# Patient Record
Sex: FEMALE | Race: WHITE | NOT HISPANIC OR LATINO | Employment: UNEMPLOYED | URBAN - NONMETROPOLITAN AREA
[De-identification: names, ages, dates, MRNs, and addresses within clinical notes are randomized per-mention and may not be internally consistent; named-entity substitution may affect disease eponyms.]

---

## 2021-07-30 ENCOUNTER — HOSPITAL ENCOUNTER (EMERGENCY)
Facility: HOSPITAL | Age: 51
Discharge: HOME | End: 2021-07-30
Attending: EMERGENCY MEDICINE | Admitting: EMERGENCY MEDICINE
Payer: COMMERCIAL

## 2021-07-30 VITALS
HEART RATE: 84 BPM | SYSTOLIC BLOOD PRESSURE: 176 MMHG | BODY MASS INDEX: 28.17 KG/M2 | TEMPERATURE: 97.6 F | RESPIRATION RATE: 18 BRPM | HEIGHT: 64 IN | OXYGEN SATURATION: 98 % | DIASTOLIC BLOOD PRESSURE: 87 MMHG | WEIGHT: 165 LBS

## 2021-07-30 DIAGNOSIS — H60.92 LEFT OTITIS EXTERNA: Primary | ICD-10-CM

## 2021-07-30 PROCEDURE — 99282 EMERGENCY DEPT VISIT SF MDM: CPT

## 2021-07-30 PROCEDURE — 99284 EMERGENCY DEPT VISIT MOD MDM: CPT | Performed by: PHYSICIAN ASSISTANT

## 2021-07-30 PROCEDURE — 96372 THER/PROPH/DIAG INJ SC/IM: CPT

## 2021-07-30 RX ORDER — MULTIVITAMIN WITH IRON
1 TABLET ORAL DAILY
COMMUNITY

## 2021-07-30 RX ORDER — CIPROFLOXACIN 500 MG/1
500 TABLET, FILM COATED ORAL ONCE
Status: COMPLETED | OUTPATIENT
Start: 2021-07-30 | End: 2021-07-30

## 2021-07-30 RX ORDER — IBUPROFEN 600 MG/1
600 TABLET ORAL EVERY 6 HOURS PRN
Qty: 30 TABLET | Refills: 0 | Status: SHIPPED | OUTPATIENT
Start: 2021-07-30

## 2021-07-30 RX ORDER — CIPROFLOXACIN 500 MG/1
500 TABLET, FILM COATED ORAL EVERY 12 HOURS
Qty: 14 TABLET | Refills: 0 | Status: SHIPPED | OUTPATIENT
Start: 2021-07-30 | End: 2021-08-06

## 2021-07-30 RX ORDER — OMEGA-3S/DHA/EPA/FISH OIL/D3 300MG-1000
400 CAPSULE ORAL DAILY
COMMUNITY

## 2021-07-30 RX ORDER — LISINOPRIL 20 MG/1
20 TABLET ORAL DAILY
COMMUNITY

## 2021-07-30 RX ORDER — KETOROLAC TROMETHAMINE 30 MG/ML
30 INJECTION, SOLUTION INTRAMUSCULAR; INTRAVENOUS ONCE
Status: COMPLETED | OUTPATIENT
Start: 2021-07-30 | End: 2021-07-30

## 2021-07-30 RX ADMIN — CIPROFLOXACIN HYDROCHLORIDE 500 MG: 500 TABLET, FILM COATED ORAL at 20:41

## 2021-07-30 RX ADMIN — KETOROLAC TROMETHAMINE 30 MG: 30 INJECTION, SOLUTION INTRAMUSCULAR; INTRAVENOUS at 20:41

## 2021-07-31 NOTE — ED PROVIDER NOTES
History  Chief Complaint   Patient presents with    Earache     pt reports left ear pain; started monday, went to urgent care yesterday and was given drops; now states ear is blocked and drops are not going in     45year old female presents for evaluation of left ear pain  Pt reports symptoms started on Monday  She has been swimming with her grandchild  She reports she did get some water in the ear  Denies congestion or recent illness  Denies fever, chills  Denies ear injury/trauma  There has been associated swelling of the ear and drainage  Saw urgent care yesterday  Started corisporin otic  But now today canal is swollen and drops will not go in  No reported alleviating factors  Has been taking tylenol without relief  Pt does admit to cough but contributes to smoking  Denies chest pain, SOB, N/V/D, abdominal pain  PMH includes HTN, depression  History provided by:  Patient   used: No    Earache  Location:  Left  Duration:  5 days  Timing:  Constant  Progression:  Worsening  Chronicity:  New  Context: water in ear    Context: not direct blow, not foreign body in ear and not recent URI    Worsened by:  Palpation  Ineffective treatments:  OTC medications  Associated symptoms: cough (pt relates to smoking), ear discharge and headaches    Associated symptoms: no abdominal pain, no congestion, no diarrhea, no fever, no neck pain, no rash, no rhinorrhea, no sore throat, no tinnitus and no vomiting    Risk factors: no recent travel, no chronic ear infection and no prior ear surgery        Prior to Admission Medications   Prescriptions Last Dose Informant Patient Reported? Taking?    B Complex-C (B-complex with vitamin C) tablet   Yes Yes   Sig: Take 1 tablet by mouth daily   cholecalciferol (VITAMIN D3) 400 units tablet   Yes Yes   Sig: Take 400 Units by mouth daily   lisinopril (ZESTRIL) 20 mg tablet   Yes Yes   Sig: Take 20 mg by mouth daily   sertraline (ZOLOFT) 50 mg tablet Yes Yes   Sig: Take 50 mg by mouth daily      Facility-Administered Medications: None       Past Medical History:   Diagnosis Date    Hypertension        Past Surgical History:   Procedure Laterality Date     SECTION      CHOLECYSTECTOMY      TONSILLECTOMY         History reviewed  No pertinent family history  I have reviewed and agree with the history as documented  E-Cigarette/Vaping     E-Cigarette/Vaping Substances     Social History     Tobacco Use    Smoking status: Current Every Day Smoker    Smokeless tobacco: Never Used   Substance Use Topics    Alcohol use: Yes    Drug use: Yes     Types: Marijuana       Review of Systems   Constitutional: Negative  Negative for chills, fatigue and fever  HENT: Positive for ear discharge and ear pain  Negative for congestion, rhinorrhea, sore throat and tinnitus  Eyes: Negative  Negative for visual disturbance  Respiratory: Positive for cough (pt relates to smoking)  Negative for shortness of breath and wheezing  Cardiovascular: Negative  Negative for chest pain, palpitations and leg swelling  Gastrointestinal: Negative  Negative for abdominal pain, constipation, diarrhea, nausea and vomiting  Genitourinary: Negative  Negative for dysuria, flank pain, frequency and hematuria  Musculoskeletal: Negative  Negative for back pain, myalgias and neck pain  Skin: Negative  Negative for rash  Neurological: Positive for headaches  Negative for dizziness and light-headedness  Psychiatric/Behavioral: Negative  All other systems reviewed and are negative  Physical Exam  Physical Exam  Vitals and nursing note reviewed  Constitutional:       General: She is not in acute distress  Appearance: She is well-developed  She is not toxic-appearing  HENT:      Head: Normocephalic and atraumatic        Right Ear: Hearing, tympanic membrane, ear canal and external ear normal       Left Ear: External ear normal  Drainage, swelling and tenderness present  Ears:      Comments: Unable to visualize TM due to canal swelling  Nose: Nose normal       Mouth/Throat:      Mouth: Mucous membranes are moist       Pharynx: Oropharynx is clear  Uvula midline  No oropharyngeal exudate  Eyes:      General: Lids are normal  No scleral icterus  Conjunctiva/sclera: Conjunctivae normal       Pupils: Pupils are equal, round, and reactive to light  Neck:      Trachea: Trachea and phonation normal  No tracheal deviation  Cardiovascular:      Rate and Rhythm: Normal rate and regular rhythm  Pulses: Normal pulses  Heart sounds: Normal heart sounds, S1 normal and S2 normal  No murmur heard  Pulmonary:      Effort: Pulmonary effort is normal       Breath sounds: Normal breath sounds  No wheezing, rhonchi or rales  Musculoskeletal:      Cervical back: Normal range of motion and neck supple  Skin:     General: Skin is warm and dry  Capillary Refill: Capillary refill takes less than 2 seconds  Findings: No rash  Neurological:      General: No focal deficit present  Mental Status: She is alert and oriented to person, place, and time  GCS: GCS eye subscore is 4  GCS verbal subscore is 5  GCS motor subscore is 6  Cranial Nerves: No cranial nerve deficit  Sensory: Sensation is intact  No sensory deficit  Motor: Motor function is intact  No abnormal muscle tone        Gait: Gait normal    Psychiatric:         Mood and Affect: Mood normal          Speech: Speech normal          Behavior: Behavior normal          Vital Signs  ED Triage Vitals [07/30/21 2019]   Temperature Pulse Respirations Blood Pressure SpO2   97 6 °F (36 4 °C) 84 18 (!) 176/87 98 %      Temp Source Heart Rate Source Patient Position - Orthostatic VS BP Location FiO2 (%)   Temporal Monitor Sitting Right arm --      Pain Score       Worst Possible Pain           Vitals:    07/30/21 2019   BP: (!) 176/87   Pulse: 84   Patient Position - Orthostatic VS: Sitting         Visual Acuity      ED Medications  Medications   ketorolac (TORADOL) injection 30 mg (30 mg Intramuscular Given 7/30/21 2041)   ciprofloxacin (CIPRO) tablet 500 mg (500 mg Oral Given 7/30/21 2041)       Diagnostic Studies  Results Reviewed     None                 No orders to display              Procedures  Procedures         ED Course     Discussed usual course and treatment of otitis externa  Advised to keep water out of the ear  Discussed continued symptomatic/supportive care  Abx ear drop as directed  Will place on PO antibiotic due to severity and failed use of drops due to canal swelling  Continue OTC tylenol and ibuprofen as needed for fever/discomfort  Strict return precautions outlined  Advised outpatient follow up with PCP or return to ER for change in condition as outlined  Pt and caregiver verbalized understanding and had no further questions  SBIRT 20yo+      Most Recent Value   SBIRT (22 yo +)   In order to provide better care to our patients, we are screening all of our patients for alcohol and drug use  Would it be okay to ask you these screening questions? Yes Filed at: 07/30/2021 2023   Initial Alcohol Screen: US AUDIT-C    1  How often do you have a drink containing alcohol?  0 Filed at: 07/30/2021 2023   2  How many drinks containing alcohol do you have on a typical day you are drinking? 0 Filed at: 07/30/2021 2023   3a  Male UNDER 65: How often do you have five or more drinks on one occasion? 0 Filed at: 07/30/2021 2023   3b  FEMALE Any Age, or MALE 65+: How often do you have 4 or more drinks on one occassion? 0 Filed at: 07/30/2021 2023   Audit-C Score  0 Filed at: 07/30/2021 2023   CLIFF: How many times in the past year have you    Used an illegal drug or used a prescription medication for non-medical reasons?   Never Filed at: 07/30/2021 2023                    MDM  Number of Diagnoses or Management Options  Left otitis externa: new and does not require workup     Amount and/or Complexity of Data Reviewed  Decide to obtain previous medical records or to obtain history from someone other than the patient: yes  Obtain history from someone other than the patient: yes  Review and summarize past medical records: yes    Patient Progress  Patient progress: improved      Disposition  Final diagnoses:   Left otitis externa     Time reflects when diagnosis was documented in both MDM as applicable and the Disposition within this note     Time User Action Codes Description Comment    7/30/2021  8:39 PM Gerry Que Add [H60 [id number]] Left otitis externa       ED Disposition     ED Disposition Condition Date/Time Comment    Discharge Stable Fri Jul 30, 2021  8:39 PM Verito Hawthorne discharge to home/self care              Follow-up Information     Follow up With Specialties Details Why Contact Info Additional Information    Hanna Medel PA-C Physician Assistant Schedule an appointment as soon as possible for a visit   Mary [address] [place] [address] [phone]       [facility] Emergency Department Emergency Medicine  As needed [place] [phone]  [address] [place] [address] Emergency Department[address], [facility]          Discharge Medication List as of 7/30/2021  8:41 PM      START taking these medications    Details   ciprofloxacin (CIPRO) 500 mg tablet Take 1 tablet (500 mg total) by mouth every 12 (twelve) hours for 7 days, Starting Fri 7/30/2021, Until Fri 8/6/2021, Normal      ibuprofen (MOTRIN) 600 mg tablet Take 1 tablet (600 mg total) by mouth every 6 (six) hours as needed for moderate pain, Starting Fri 7/30/2021, Normal         CONTINUE these medications which have NOT CHANGED    Details   B Complex-C (B-complex with vitamin C) tablet Take 1 tablet by mouth daily, Historical Med      cholecalciferol (VITAMIN D3) 400 units tablet Take 400 Units by mouth daily, Historical Med      lisinopril (ZESTRIL) 20 mg tablet Take 20 mg by mouth daily, Historical Med      sertraline (ZOLOFT) 50 mg tablet Take 50 mg by mouth daily, Historical Med           No discharge procedures on file      PDMP Review     None          ED Provider  Electronically Signed by           Lucero Cherry PA-C  07/30/21 [id number]

## 2021-07-31 NOTE — DISCHARGE INSTRUCTIONS
Cool compresses  Keep water out of the ear  Use ear drops as prescribed  Take antibiotic as directed for the full duration  Continue to alternate OTC tylenol and ibuprofen as needed for fever/discomfort  Follow up with PCP in 3-5 days if not improving or return to ER as needed